# Patient Record
Sex: FEMALE | Race: WHITE | NOT HISPANIC OR LATINO | Employment: FULL TIME | URBAN - METROPOLITAN AREA
[De-identification: names, ages, dates, MRNs, and addresses within clinical notes are randomized per-mention and may not be internally consistent; named-entity substitution may affect disease eponyms.]

---

## 2018-03-21 ENCOUNTER — LAB REQUISITION (OUTPATIENT)
Dept: LAB | Facility: HOSPITAL | Age: 53
End: 2018-03-21

## 2018-03-21 DIAGNOSIS — Z00.00 ROUTINE GENERAL MEDICAL EXAMINATION AT A HEALTH CARE FACILITY: ICD-10-CM

## 2018-03-21 PROCEDURE — 36415 COLL VENOUS BLD VENIPUNCTURE: CPT

## 2018-06-20 ENCOUNTER — APPOINTMENT (OUTPATIENT)
Dept: WOMENS IMAGING | Facility: HOSPITAL | Age: 53
End: 2018-06-20

## 2018-06-20 PROCEDURE — 76830 TRANSVAGINAL US NON-OB: CPT | Performed by: RADIOLOGY

## 2018-06-20 PROCEDURE — 77067 SCR MAMMO BI INCL CAD: CPT | Performed by: RADIOLOGY

## 2018-06-20 PROCEDURE — 77063 BREAST TOMOSYNTHESIS BI: CPT | Performed by: RADIOLOGY

## 2018-09-20 ENCOUNTER — OFFICE VISIT (OUTPATIENT)
Dept: SURGERY | Facility: CLINIC | Age: 53
End: 2018-09-20

## 2018-09-20 VITALS — HEIGHT: 63 IN | HEART RATE: 109 BPM | WEIGHT: 181.6 LBS | BODY MASS INDEX: 32.18 KG/M2 | OXYGEN SATURATION: 98 %

## 2018-09-20 DIAGNOSIS — R10.33 UMBILICAL PAIN: ICD-10-CM

## 2018-09-20 DIAGNOSIS — D23.9 DERMATOFIBROMA: ICD-10-CM

## 2018-09-20 DIAGNOSIS — Z12.11 SCREEN FOR COLON CANCER: Primary | ICD-10-CM

## 2018-09-20 PROCEDURE — 99243 OFF/OP CNSLTJ NEW/EST LOW 30: CPT | Performed by: SURGERY

## 2018-09-20 RX ORDER — HYDROCODONE BITARTRATE AND ACETAMINOPHEN 7.5; 325 MG/1; MG/1
1 TABLET ORAL EVERY 8 HOURS PRN
COMMUNITY
End: 2020-10-20 | Stop reason: SDDI

## 2018-09-20 RX ORDER — CELECOXIB 200 MG/1
200 CAPSULE ORAL DAILY
COMMUNITY
End: 2021-01-15 | Stop reason: SDDI

## 2018-09-20 RX ORDER — PHENTERMINE HYDROCHLORIDE 37.5 MG/1
37.5 TABLET ORAL
COMMUNITY
End: 2020-10-20 | Stop reason: SDDI

## 2018-09-20 RX ORDER — AMITRIPTYLINE HYDROCHLORIDE 100 MG/1
100 TABLET, FILM COATED ORAL NIGHTLY
COMMUNITY
End: 2020-10-20 | Stop reason: DRUGHIGH

## 2018-09-20 NOTE — PROGRESS NOTES
SUMMARY (A/P):    53-year-old lady with 3 issues today:  1.  Periumbilical pain without visible or palpable evidence of recurrent umbilical/incisional hernia.  As her symptoms or not particular bothersome at this point no further workup is necessary.  If the pain worsens or asymmetry worsens then I recommended that she call back and we will schedule her for CT scan.  2.  Small dermatofibroma upper mid back, discussed nature of this lesion and no need for further workup or treatment.  Will return if it enlarges or becomes symptomatic.  3.  Screening colonoscopy she has not had one yet and has scheduled accordingly.      CC:    Umbilical pain, referred for consultation by Lavinia SHORT    HPI:    53-year-old lady who had previous umbilical hernia repair in 2007 and recurrent umbilical/incisional hernia repair with mesh in 2011 (by me) returns today with several month history of periumbilical pain that is mild at worst and associated with visible and palpable bulge.    PSH:    Umbilical hernia repair 2007  Incisional hernia repair with mesh 2011  Open hysterectomy (ovaries not removed) 2018    PMH:    Fibromyalgia  Anxiety/depression    FAMILY HISTORY:    Negative for colorectal cancer    SOCIAL HISTORY:   Denies tobacco use  Occasional alcohol use    ALLERGIES: reviewed, in Epic    MEDICATIONS: reviewed, in Epic    ROS:  No chest pain or shortness of air.  All other systems reviewed and negative other than presenting complaints.    PHYSICAL EXAM:   Constitutional: Well-developed well-nourished, no acute distress  Vital signs: Heart rate 109, weight 181 pounds, height feet 3 inches, BMI 32.2  Eyes: Conjunctiva normal, sclera nonicteric  ENMT: Hearing grossly normal, oral mucosa moist  Neck: Supple, no palpable mass, normal thyroid, trachea midline  Respiratory: Clear to auscultation, normal inspiratory effort  Cardiovascular: Regular rate, no murmur, no carotid bruit, no peripheral edema, no jugular venous  distention  Gastrointestinal: Soft, nontender, no palpable mass, no hepatosplenomegaly, well-healed incisions around the umbilicus with no visible or palpable evidence of hernia, there is some slight asymmetry of fat distribution on the left compared to right  Lymphatics (palpable nodes):  cervical-negative, axillary-negative  Skin:  Warm, dry, no rash on visualized skin surfaces  Musculoskeletal: Symmetric strength, normal gait  Psychiatric: Alert and oriented ×3, normal affect     BHANU SHEEHAN M.D.

## 2018-11-13 ENCOUNTER — TELEPHONE (OUTPATIENT)
Dept: SURGERY | Facility: CLINIC | Age: 53
End: 2018-11-13

## 2018-11-13 NOTE — TELEPHONE ENCOUNTER
Pt left message stating she could not make her c-scope tomorrow due to being short staffed at work. Tried to return her call to let her know Dr Ibarra is booked for the rest of the year and we would call her in Dec but her voicemail is full. I have taken her off tomorrows schedule though.

## 2020-10-02 ENCOUNTER — TELEPHONE (OUTPATIENT)
Dept: ONCOLOGY | Facility: CLINIC | Age: 55
End: 2020-10-02

## 2020-10-02 NOTE — TELEPHONE ENCOUNTER
Telephone call regarding this patient from Dr. Mathew, primary care.    She presented to him with multiple complaints.  Chemistries showed a calcium level that he states was 1/10 of a point elevated.  Serum protein electrophoresis showed a 0.3 g M spike.  He is concerned about the possibility of plasma cell dyscrasia.    I advised him that we will be glad to see her to further evaluate this issue.  This may simply be a benign monoclonal gammopathy but certainly warrants further evaluation.

## 2020-10-07 ENCOUNTER — TELEPHONE (OUTPATIENT)
Dept: ONCOLOGY | Facility: CLINIC | Age: 55
End: 2020-10-07

## 2020-10-07 NOTE — TELEPHONE ENCOUNTER
----- Message from Saúl Yun MD sent at 10/6/2020  4:14 PM EDT -----  Regarding: RE: Referral from Dr. Salgado  4 to 6 weeks is probably okay  ----- Message -----  From: Miya Moeller  Sent: 10/6/2020   3:22 PM EDT  To: Saúl Yun MD  Subject: RE: Referral from Dr. Naomi Yun - Dr Mathew office has called and referred the pt to our office that Dr Mathew had spoken to you - we are awaiting medical records before they will schedule the pt. Is there a specific timeframe that you would want to see the pt. Please advise     Thank you   Miya   ----- Message -----  From: Saúl Yun MD  Sent: 10/2/2020   3:18 PM EDT  To: [facility]  Subject: Referral from Dr. Salgado                        If this patient is referred by Dr. Mathew I will be glad to see her.  I spoke with him on the phone today. Thanks, SOPHIA

## 2020-10-07 NOTE — TELEPHONE ENCOUNTER
----- Message from Saúl Yun MD sent at 10/2/2020  3:18 PM EDT -----  Regarding: Referral from Dr. Salgado  If this patient is referred by Dr. Mathew I will be glad to see her.  I spoke with him on the phone today. Thanks, SOPHIA

## 2020-10-07 NOTE — TELEPHONE ENCOUNTER
PT HAS BEEN SCHEDULED FOR HER NEW PT APPT AND I HAVE SENT MESS TO DR SMITH - SHE WANTED TO GET IN SOONER RATHER THAN LATER

## 2020-10-20 ENCOUNTER — CONSULT (OUTPATIENT)
Dept: ONCOLOGY | Facility: CLINIC | Age: 55
End: 2020-10-20

## 2020-10-20 ENCOUNTER — LAB (OUTPATIENT)
Dept: LAB | Facility: HOSPITAL | Age: 55
End: 2020-10-20

## 2020-10-20 VITALS
TEMPERATURE: 98.2 F | HEIGHT: 61 IN | WEIGHT: 188.4 LBS | HEART RATE: 102 BPM | BODY MASS INDEX: 35.57 KG/M2 | RESPIRATION RATE: 16 BRPM | OXYGEN SATURATION: 97 % | SYSTOLIC BLOOD PRESSURE: 158 MMHG | DIASTOLIC BLOOD PRESSURE: 91 MMHG

## 2020-10-20 DIAGNOSIS — D47.2 MONOCLONAL GAMMOPATHY: Primary | ICD-10-CM

## 2020-10-20 DIAGNOSIS — R77.8 ABNORMAL SERUM PROTEIN ELECTROPHORESIS: Primary | ICD-10-CM

## 2020-10-20 LAB
ALBUMIN SERPL-MCNC: 4.8 G/DL (ref 3.5–5.2)
ALBUMIN/GLOB SERPL: 1.8 G/DL (ref 1.1–2.4)
ALP SERPL-CCNC: 66 U/L (ref 38–116)
ALT SERPL W P-5'-P-CCNC: 55 U/L (ref 0–33)
ANION GAP SERPL CALCULATED.3IONS-SCNC: 9.9 MMOL/L (ref 5–15)
AST SERPL-CCNC: 34 U/L (ref 0–32)
B2 MICROGLOB SERPL-MCNC: 1.7 MG/L (ref 0.8–2.2)
BASOPHILS # BLD AUTO: 0.1 10*3/MM3 (ref 0–0.2)
BASOPHILS NFR BLD AUTO: 0.8 % (ref 0–1.5)
BILIRUB SERPL-MCNC: 0.3 MG/DL (ref 0.2–1.2)
BUN SERPL-MCNC: 11 MG/DL (ref 6–20)
BUN/CREAT SERPL: 12 (ref 7.3–30)
CALCIUM SPEC-SCNC: 9.9 MG/DL (ref 8.5–10.2)
CHLORIDE SERPL-SCNC: 101 MMOL/L (ref 98–107)
CO2 SERPL-SCNC: 27.1 MMOL/L (ref 22–29)
CREAT SERPL-MCNC: 0.92 MG/DL (ref 0.6–1.1)
DEPRECATED RDW RBC AUTO: 41.7 FL (ref 37–54)
EOSINOPHIL # BLD AUTO: 0.14 10*3/MM3 (ref 0–0.4)
EOSINOPHIL NFR BLD AUTO: 1.1 % (ref 0.3–6.2)
ERYTHROCYTE [DISTWIDTH] IN BLOOD BY AUTOMATED COUNT: 12.2 % (ref 12.3–15.4)
GFR SERPL CREATININE-BSD FRML MDRD: 64 ML/MIN/1.73
GLOBULIN UR ELPH-MCNC: 2.7 GM/DL (ref 1.8–3.5)
GLUCOSE SERPL-MCNC: 86 MG/DL (ref 74–124)
HCT VFR BLD AUTO: 42 % (ref 34–46.6)
HGB BLD-MCNC: 14.5 G/DL (ref 12–15.9)
IMM GRANULOCYTES # BLD AUTO: 0.06 10*3/MM3 (ref 0–0.05)
IMM GRANULOCYTES NFR BLD AUTO: 0.5 % (ref 0–0.5)
LYMPHOCYTES # BLD AUTO: 4.4 10*3/MM3 (ref 0.7–3.1)
LYMPHOCYTES NFR BLD AUTO: 34.7 % (ref 19.6–45.3)
MCH RBC QN AUTO: 31.9 PG (ref 26.6–33)
MCHC RBC AUTO-ENTMCNC: 34.5 G/DL (ref 31.5–35.7)
MCV RBC AUTO: 92.5 FL (ref 79–97)
MONOCYTES # BLD AUTO: 0.71 10*3/MM3 (ref 0.1–0.9)
MONOCYTES NFR BLD AUTO: 5.6 % (ref 5–12)
NEUTROPHILS NFR BLD AUTO: 57.3 % (ref 42.7–76)
NEUTROPHILS NFR BLD AUTO: 7.28 10*3/MM3 (ref 1.7–7)
NRBC BLD AUTO-RTO: 0 /100 WBC (ref 0–0.2)
PLATELET # BLD AUTO: 383 10*3/MM3 (ref 140–450)
PMV BLD AUTO: 9.4 FL (ref 6–12)
POTASSIUM SERPL-SCNC: 4.1 MMOL/L (ref 3.5–4.7)
PROT SERPL-MCNC: 7.5 G/DL (ref 6.3–8)
RBC # BLD AUTO: 4.54 10*6/MM3 (ref 3.77–5.28)
SODIUM SERPL-SCNC: 138 MMOL/L (ref 134–145)
WBC # BLD AUTO: 12.69 10*3/MM3 (ref 3.4–10.8)

## 2020-10-20 PROCEDURE — 36415 COLL VENOUS BLD VENIPUNCTURE: CPT

## 2020-10-20 PROCEDURE — 82232 ASSAY OF BETA-2 PROTEIN: CPT | Performed by: INTERNAL MEDICINE

## 2020-10-20 PROCEDURE — 80053 COMPREHEN METABOLIC PANEL: CPT | Performed by: INTERNAL MEDICINE

## 2020-10-20 PROCEDURE — 99205 OFFICE O/P NEW HI 60 MIN: CPT | Performed by: INTERNAL MEDICINE

## 2020-10-20 PROCEDURE — 85025 COMPLETE CBC W/AUTO DIFF WBC: CPT

## 2020-10-20 RX ORDER — OXYCODONE HYDROCHLORIDE 5 MG/1
TABLET ORAL
COMMUNITY
Start: 2020-09-29 | End: 2021-01-15 | Stop reason: SDDI

## 2020-10-20 RX ORDER — NITROGLYCERIN 0.4 MG/1
TABLET SUBLINGUAL
COMMUNITY
Start: 2020-10-01

## 2020-10-20 RX ORDER — TRAZODONE HYDROCHLORIDE 50 MG/1
TABLET ORAL
COMMUNITY
Start: 2020-10-19

## 2020-10-20 RX ORDER — METOPROLOL SUCCINATE 25 MG/1
TABLET, EXTENDED RELEASE ORAL
COMMUNITY
Start: 2020-10-01 | End: 2021-06-25

## 2020-10-20 RX ORDER — AMITRIPTYLINE HYDROCHLORIDE 50 MG/1
TABLET, FILM COATED ORAL
COMMUNITY
Start: 2020-09-14

## 2020-10-20 NOTE — PROGRESS NOTES
REFERRING PROVIDER:    Fredrick Mathew MD  [address] [place] [id number]    REASON FOR CONSULTATION:    IgG lambda monoclonal gammopathy    HISTORY OF PRESENT ILLNESS:  Nicki Pepe is a 55 y.o. female who is referred today for further evaluation of IgG lambda monoclonal gammopathy.    She presented to primary care for further evaluation of chest pain that had been going on for a couple of months.  This is associated with shortness of breath.  It is associated with anxiety and stressful situations.  It did improve with nitroglycerin.  She had a stress test but has not heard the result.    Labs 9/18/2020 with creatinine 0.82, total protein 7.9, albumin 5.2 (high), ALT 52, iron 164 (high), calcium 10.3 (slightly high), and hgb 13.7.    Due to hypercalcemia she had a SPEP and MARCELO on 9/25/2020 showed  0.3 gm IgG lambda M spike.  Ferritin 56.    She denies any prior history of blood disorders.  She does have some chronic pain issues and has been diagnosed with fibromyalgia.  She does have some fatigue.    Past Medical History:   Diagnosis Date   • Anxiety    • Depression    • Fibromyalgia    • Osteoarthritis        Past Surgical History:   Procedure Laterality Date   • HYSTERECTOMY N/A 07/23/2018   • INCISIONAL HERNIA REPAIR N/A 12/23/2011    Open incisional hernia repair with mesh-Dr. Lawrence Ibarra   • REDUCTION MAMMAPLASTY Bilateral 12/26/2006    Dr. SARAH Nobles   • TUBAL ABDOMINAL LIGATION  1996       SOCIAL HISTORY:   reports that she has never smoked. She has never used smokeless tobacco. She reports current alcohol use of about 1.0 standard drinks of alcohol per week. She reports that she does not use drugs.    FAMILY HISTORY:  family history includes Pancreatic cancer in her father.    ALLERGIES:  No Known Allergies    MEDICATIONS:  The medication list has been reviewed with the patient by the medical assistant, and the list has been updated in the electronic medical record, which I reviewed.   "Medication dosages and frequencies were confirmed to be accurate.    Review of Systems   Constitutional: Positive for fatigue.   Respiratory: Positive for shortness of breath.    Cardiovascular: Positive for chest pain.   Musculoskeletal: Positive for arthralgias, back pain and myalgias.   Psychiatric/Behavioral: The patient is nervous/anxious.    All other systems reviewed and are negative.      Vitals:    10/20/20 1423   BP: 158/91   Pulse: 102   Resp: 16   Temp: 98.2 °F (36.8 °C)   TempSrc: Temporal   SpO2: 97%   Weight: 85.5 kg (188 lb 6.4 oz)   Height: 155 cm (61.02\")   PainSc: 0-No pain  Comment: Abnormal serum protien electrophresis       Physical Exam  Vitals signs reviewed.   Constitutional:       Appearance: She is well-developed.   HENT:      Head: Normocephalic and atraumatic.      Comments: Wearing a facemask  Eyes:      Conjunctiva/sclera: Conjunctivae normal.      Pupils: Pupils are equal, round, and reactive to light.   Neck:      Musculoskeletal: Neck supple.   Cardiovascular:      Rate and Rhythm: Normal rate and regular rhythm.      Heart sounds: Normal heart sounds, S1 normal and S2 normal. No murmur. No friction rub. No gallop.    Pulmonary:      Effort: Pulmonary effort is normal. No respiratory distress.      Breath sounds: Normal breath sounds. No stridor. No wheezing, rhonchi or rales.   Chest:      Chest wall: No tenderness.   Abdominal:      General: Bowel sounds are normal. There is no distension.      Palpations: Abdomen is soft. There is no mass.      Tenderness: There is no abdominal tenderness. There is no guarding or rebound.   Musculoskeletal: Normal range of motion.   Lymphadenopathy:      Cervical: No cervical adenopathy.      Upper Body:      Right upper body: No supraclavicular adenopathy.      Left upper body: No supraclavicular adenopathy.   Skin:     General: Skin is warm and dry.      Findings: No erythema or rash.   Neurological:      Mental Status: She is alert and " oriented to person, place, and time.      Cranial Nerves: No cranial nerve deficit.      Sensory: No sensory deficit.   Psychiatric:         Behavior: Behavior normal.         Thought Content: Thought content normal.         Judgment: Judgment normal.         DIAGNOSTIC DATA:  Results for orders placed or performed in visit on 10/20/20   CBC Auto Differential    Specimen: Blood   Result Value Ref Range    WBC 12.69 (H) 3.40 - 10.80 10*3/mm3    RBC 4.54 3.77 - 5.28 10*6/mm3    Hemoglobin 14.5 12.0 - 15.9 g/dL    Hematocrit 42.0 34.0 - 46.6 %    MCV 92.5 79.0 - 97.0 fL    MCH 31.9 26.6 - 33.0 pg    MCHC 34.5 31.5 - 35.7 g/dL    RDW 12.2 (L) 12.3 - 15.4 %    RDW-SD 41.7 37.0 - 54.0 fl    MPV 9.4 6.0 - 12.0 fL    Platelets 383 140 - 450 10*3/mm3    Neutrophil % 57.3 42.7 - 76.0 %    Lymphocyte % 34.7 19.6 - 45.3 %    Monocyte % 5.6 5.0 - 12.0 %    Eosinophil % 1.1 0.3 - 6.2 %    Basophil % 0.8 0.0 - 1.5 %    Immature Grans % 0.5 0.0 - 0.5 %    Neutrophils, Absolute 7.28 (H) 1.70 - 7.00 10*3/mm3    Lymphocytes, Absolute 4.40 (H) 0.70 - 3.10 10*3/mm3    Monocytes, Absolute 0.71 0.10 - 0.90 10*3/mm3    Eosinophils, Absolute 0.14 0.00 - 0.40 10*3/mm3    Basophils, Absolute 0.10 0.00 - 0.20 10*3/mm3    Immature Grans, Absolute 0.06 (H) 0.00 - 0.05 10*3/mm3    nRBC 0.0 0.0 - 0.2 /100 WBC       IMAGING:    I personally reviewed the peripheral blood smear.  Red cells appear normal.  Platelets adequate in number and normal in morphology.  I do not visualize any significant white blood cell abnormalities.    ASSESSMENT:  This is a 55 y.o. female with:    *IgG lambda monoclonal gammopathy  • SPEP/MARCELO on 9/25/2020 with a 0.3 gm IgG lambda M spike  • No anemia or kidney disease is evident  • Calcium was slightly high at 10.3  • Likely MGUS    *Hypercalcemia  • Ca 10.3 on 9/18/2020  • Repeat today    *Chest pain  • She recently had a stress test.  Result unknown at this point.  • If this persists and further cardiac evaluation is  negative some imaging of her chest with a chest CT would be reasonable.  I think a plasmacytoma is unlikely but this could be possible.    PLAN:   1. Repeat SPEP/MARCELO today and check serum free light chains and beta-2 microglobulin. CMP today as well.  2. At this point I do not believe that a bone marrow aspiration and biopsy or skeletal survey are necessary.  3. If her cardiac evaluation is unremarkable and the chest pain persists, consider CT imaging of her chest.  4. I will notify her of any abnormal laboratory results and otherwise plan to see her back in a few months for follow-up.

## 2020-10-21 LAB
ALBUMIN SERPL ELPH-MCNC: 4 G/DL (ref 2.9–4.4)
ALBUMIN/GLOB SERPL: 1.2 {RATIO} (ref 0.7–1.7)
ALPHA1 GLOB SERPL ELPH-MCNC: 0.2 G/DL (ref 0–0.4)
ALPHA2 GLOB SERPL ELPH-MCNC: 1 G/DL (ref 0.4–1)
B-GLOBULIN SERPL ELPH-MCNC: 1.1 G/DL (ref 0.7–1.3)
GAMMA GLOB SERPL ELPH-MCNC: 1.2 G/DL (ref 0.4–1.8)
GLOBULIN SER-MCNC: 3.5 G/DL (ref 2.2–3.9)
IGA SERPL-MCNC: 224 MG/DL (ref 87–352)
IGG SERPL-MCNC: 1037 MG/DL (ref 586–1602)
IGM SERPL-MCNC: 115 MG/DL (ref 26–217)
INTERPRETATION SERPL IEP-IMP: ABNORMAL
KAPPA LC FREE SER-MCNC: 20 MG/L (ref 3.3–19.4)
KAPPA LC FREE/LAMBDA FREE SER: 1.22 {RATIO} (ref 0.26–1.65)
LABORATORY COMMENT REPORT: ABNORMAL
LAMBDA LC FREE SERPL-MCNC: 16.4 MG/L (ref 5.7–26.3)
M PROTEIN SERPL ELPH-MCNC: 0.4 G/DL
PROT SERPL-MCNC: 7.5 G/DL (ref 6–8.5)

## 2020-10-26 ENCOUNTER — TELEPHONE (OUTPATIENT)
Dept: ONCOLOGY | Facility: HOSPITAL | Age: 55
End: 2020-10-26

## 2020-10-26 NOTE — TELEPHONE ENCOUNTER
REC'D MESSAGE PER DR. SMITH TO LET PT KNOW ABOUT ABN PROTEIN RESULTS - NO WORRIES, F/U AS SCHEDULED. MADE PT'S  AWARE AND HE V/U.

## 2021-01-15 ENCOUNTER — LAB (OUTPATIENT)
Dept: OTHER | Facility: HOSPITAL | Age: 56
End: 2021-01-15

## 2021-01-15 ENCOUNTER — OFFICE VISIT (OUTPATIENT)
Dept: ONCOLOGY | Facility: CLINIC | Age: 56
End: 2021-01-15

## 2021-01-15 VITALS
BODY MASS INDEX: 34.76 KG/M2 | RESPIRATION RATE: 16 BRPM | DIASTOLIC BLOOD PRESSURE: 84 MMHG | HEART RATE: 101 BPM | WEIGHT: 196.2 LBS | TEMPERATURE: 98 F | SYSTOLIC BLOOD PRESSURE: 149 MMHG | OXYGEN SATURATION: 96 % | HEIGHT: 63 IN

## 2021-01-15 DIAGNOSIS — D47.2 MONOCLONAL GAMMOPATHY: ICD-10-CM

## 2021-01-15 DIAGNOSIS — D47.2 MONOCLONAL GAMMOPATHY: Primary | ICD-10-CM

## 2021-01-15 LAB
ALBUMIN SERPL-MCNC: 4.8 G/DL (ref 3.5–5.2)
ALBUMIN/GLOB SERPL: 1.5 G/DL
ALP SERPL-CCNC: 100 U/L (ref 39–117)
ALT SERPL W P-5'-P-CCNC: 70 U/L (ref 1–33)
ANION GAP SERPL CALCULATED.3IONS-SCNC: 9.2 MMOL/L (ref 5–15)
AST SERPL-CCNC: 31 U/L (ref 1–32)
BASOPHILS # BLD AUTO: 0.06 10*3/MM3 (ref 0–0.2)
BASOPHILS NFR BLD AUTO: 0.5 % (ref 0–1.5)
BILIRUB SERPL-MCNC: 0.4 MG/DL (ref 0–1.2)
BUN SERPL-MCNC: 13 MG/DL (ref 6–20)
BUN/CREAT SERPL: 15.5 (ref 7–25)
CALCIUM SPEC-SCNC: 10.1 MG/DL (ref 8.6–10.5)
CHLORIDE SERPL-SCNC: 98 MMOL/L (ref 98–107)
CO2 SERPL-SCNC: 30.8 MMOL/L (ref 22–29)
CREAT SERPL-MCNC: 0.84 MG/DL (ref 0.57–1)
DEPRECATED RDW RBC AUTO: 46.1 FL (ref 37–54)
EOSINOPHIL # BLD AUTO: 0.24 10*3/MM3 (ref 0–0.4)
EOSINOPHIL NFR BLD AUTO: 2 % (ref 0.3–6.2)
ERYTHROCYTE [DISTWIDTH] IN BLOOD BY AUTOMATED COUNT: 13 % (ref 12.3–15.4)
GFR SERPL CREATININE-BSD FRML MDRD: 71 ML/MIN/1.73
GLOBULIN UR ELPH-MCNC: 3.2 GM/DL
GLUCOSE SERPL-MCNC: 129 MG/DL (ref 65–99)
HCT VFR BLD AUTO: 42.2 % (ref 34–46.6)
HGB BLD-MCNC: 13.9 G/DL (ref 12–15.9)
IMM GRANULOCYTES # BLD AUTO: 0.04 10*3/MM3 (ref 0–0.05)
IMM GRANULOCYTES NFR BLD AUTO: 0.3 % (ref 0–0.5)
LYMPHOCYTES # BLD AUTO: 4.25 10*3/MM3 (ref 0.7–3.1)
LYMPHOCYTES NFR BLD AUTO: 35.7 % (ref 19.6–45.3)
MCH RBC QN AUTO: 31.6 PG (ref 26.6–33)
MCHC RBC AUTO-ENTMCNC: 32.9 G/DL (ref 31.5–35.7)
MCV RBC AUTO: 95.9 FL (ref 79–97)
MONOCYTES # BLD AUTO: 0.57 10*3/MM3 (ref 0.1–0.9)
MONOCYTES NFR BLD AUTO: 4.8 % (ref 5–12)
NEUTROPHILS NFR BLD AUTO: 56.7 % (ref 42.7–76)
NEUTROPHILS NFR BLD AUTO: 6.75 10*3/MM3 (ref 1.7–7)
NRBC BLD AUTO-RTO: 0 /100 WBC (ref 0–0.2)
PLATELET # BLD AUTO: 358 10*3/MM3 (ref 140–450)
PMV BLD AUTO: 9 FL (ref 6–12)
POTASSIUM SERPL-SCNC: 3.7 MMOL/L (ref 3.5–5.2)
PROT SERPL-MCNC: 8 G/DL (ref 6–8.5)
RBC # BLD AUTO: 4.4 10*6/MM3 (ref 3.77–5.28)
SODIUM SERPL-SCNC: 138 MMOL/L (ref 136–145)
WBC # BLD AUTO: 11.91 10*3/MM3 (ref 3.4–10.8)

## 2021-01-15 PROCEDURE — 82784 ASSAY IGA/IGD/IGG/IGM EACH: CPT | Performed by: INTERNAL MEDICINE

## 2021-01-15 PROCEDURE — 80053 COMPREHEN METABOLIC PANEL: CPT | Performed by: INTERNAL MEDICINE

## 2021-01-15 PROCEDURE — 83883 ASSAY NEPHELOMETRY NOT SPEC: CPT | Performed by: INTERNAL MEDICINE

## 2021-01-15 PROCEDURE — 99214 OFFICE O/P EST MOD 30 MIN: CPT | Performed by: INTERNAL MEDICINE

## 2021-01-15 PROCEDURE — 84165 PROTEIN E-PHORESIS SERUM: CPT | Performed by: INTERNAL MEDICINE

## 2021-01-15 PROCEDURE — 85025 COMPLETE CBC W/AUTO DIFF WBC: CPT | Performed by: INTERNAL MEDICINE

## 2021-01-15 PROCEDURE — 86334 IMMUNOFIX E-PHORESIS SERUM: CPT | Performed by: INTERNAL MEDICINE

## 2021-01-15 PROCEDURE — 36415 COLL VENOUS BLD VENIPUNCTURE: CPT

## 2021-01-15 RX ORDER — HYDROCODONE BITARTRATE AND ACETAMINOPHEN 7.5; 325 MG/1; MG/1
1 TABLET ORAL EVERY 6 HOURS PRN
COMMUNITY

## 2021-01-15 NOTE — PROGRESS NOTES
"[facility] CBC GROUP OUTPATIENT FOLLOW UP CLINIC VISIT    REASON FOR FOLLOW-UP:    IgG lambda monoclonal gammopathy    HISTORY OF PRESENT ILLNESS:  Nicki Pepe is a 55 y.o. female who returns today for follow up of the above issue.      She overall is doing about the same.  She has chronic pain.  Chest pain specifically has resolved.  She did have a lot of left arm pain after her second Covid vaccine injection but this has improved.    REVIEW OF SYSTEMS:  Ongoing chronic fatigue.  Chronic pain.    PHYSICAL EXAMINATION:    Vitals:    01/15/21 1505   BP: 149/84   Pulse: 101   Resp: 16   Temp: 98 °F (36.7 °C)   TempSrc: Temporal   SpO2: 96%   Weight: 89 kg (196 lb 3.2 oz)   Height: 160 cm (62.99\")   PainSc: 0-No pain  Comment: IgG lambda monoclonal gammopathy       GENERAL:  Well-developed well-nourished female; awake, alert and oriented, in no acute distress.  SKIN:  Warm and dry, without rashes, purpura, or petechiae.  HEAD:  Normocephalic, atraumatic. Wearing a facemask.  EARS:  Hearing intact.  LYMPHATICS:  No cervical, supraclavicular, or axillary lymphadenopathy.  CHEST:  Lungs are clear to auscultation bilaterally.  No wheezes, rales, or rhonchi.  HEART:  Regular rate; normal rhythm.  No murmurs, gallops or rubs.  EXTREMITIES:  No clubbing cyanosis or edema.  NEUROLOGICAL:  No focal neurologic deficits.    DIAGNOSTIC DATA:  Comprehensive Metabolic Panel (01/15/2021 15:01)  CBC & Differential (01/15/2021 15:01)      IMAGING:  None reviewed    ASSESSMENT:  This is a 55 y.o. female with:    *IgG lambda monoclonal gammopathy  · SPEP/MARCELO on 9/25/2020 with a 0.3 gm IgG lambda M spike  · No anemia or kidney disease is evident  · Calcium was slightly high at 10.3  · Labs 10/20/2020 with a 0.4 gm M spike, K/L ratio normal  · Labs pending from today     *History of hypercalcemia  · Ca 10.3 on 9/18/2020  · Repeat was 9.9 on 10/20/2020  · Calcium normal today     *Chest pain  · She states that her stress test was " Continue Lisinopril, HCTZ  4/29: Hold lasix for now. Already had dose this am. Give gentle hydration given rising Cr. Likely a little dry  4/30 cont to hold lasix, lisinopril and hctz LAUREL hose.   unremarkable.  Chest pain has essentially resolved.     *Elevated liver labs  · AST is now normal at 31 with an ALT a little bit more elevated at 70  · Continue monitoring    PLAN:   1. Follow up SPEP/MARCELO/SFLC  2. At this point I do not believe that a bone marrow aspiration and biopsy or skeletal survey are necessary.  3. Follow-up in 6 months with a CBC, CMP, serum protein studies

## 2021-01-18 LAB
ALBUMIN SERPL ELPH-MCNC: 3.9 G/DL (ref 2.9–4.4)
ALBUMIN/GLOB SERPL: 1.1 {RATIO} (ref 0.7–1.7)
ALPHA1 GLOB SERPL ELPH-MCNC: 0.3 G/DL (ref 0–0.4)
ALPHA2 GLOB SERPL ELPH-MCNC: 0.8 G/DL (ref 0.4–1)
B-GLOBULIN SERPL ELPH-MCNC: 1.5 G/DL (ref 0.7–1.3)
GAMMA GLOB SERPL ELPH-MCNC: 1 G/DL (ref 0.4–1.8)
GLOBULIN SER-MCNC: 3.7 G/DL (ref 2.2–3.9)
IGA SERPL-MCNC: 233 MG/DL (ref 87–352)
IGG SERPL-MCNC: 976 MG/DL (ref 586–1602)
IGM SERPL-MCNC: 129 MG/DL (ref 26–217)
INTERPRETATION SERPL IEP-IMP: ABNORMAL
KAPPA LC FREE SER-MCNC: 21.4 MG/L (ref 3.3–19.4)
KAPPA LC FREE/LAMBDA FREE SER: 1.03 {RATIO} (ref 0.26–1.65)
LABORATORY COMMENT REPORT: ABNORMAL
LAMBDA LC FREE SERPL-MCNC: 20.7 MG/L (ref 5.7–26.3)
M PROTEIN SERPL ELPH-MCNC: 0.4 G/DL
PROT SERPL-MCNC: 7.6 G/DL (ref 6–8.5)

## 2021-05-03 ENCOUNTER — TELEPHONE (OUTPATIENT)
Dept: ORTHOPEDIC SURGERY | Facility: CLINIC | Age: 56
End: 2021-05-03

## 2021-06-09 NOTE — PROGRESS NOTES
Subjective   History of Present Illness: Nicki Pepe is a 56 y.o. female is being seen for consultation today at the request of KORIN Metzger for constant neck pain that radiates into bilateral arms; right greater than left with numbness, tingling and weakness depending on activity level.  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  She denies any specific injury that led to this pattern of symptomatology.  She also reports constant back pain that radiates into bilateral buttocks; right greater than left. She reports numbness in bilateral legs. She denies any loss of bowel and bladder loss. She is currently in pain management with Dr. Knight and has tried RFA's and trigger point injections which have given her temporary satisfaction.     She presents with cervical thoracic and lumbar MRIs but really her primary complaint is the upper thoracic and right shoulder blade pain that keeps her from comfortably working and doing everyday activities.  The low back pain is a nuisance but not limiting.    While in the room and during my examination of the patient I wore a mask and eye protection.  I washed my hands before and after this patient encounter.  The patient was also wearing a mask.    Neck Pain   The problem occurs constantly. The problem has been gradually worsening. The pain is present in the left side, midline and right side. The quality of the pain is described as aching, burning, cramping, stabbing and shooting. The symptoms are aggravated by position, twisting and bending. Associated symptoms include numbness, tingling and weakness. Treatments tried: injection therapy.   Back Pain  The problem occurs constantly. The problem has been gradually worsening since onset. The pain is present in the lumbar spine and thoracic spine. The quality of the pain is  "described as aching and shooting. The pain radiates to the right thigh, right knee, right foot, left thigh, left knee and left foot. The symptoms are aggravated by position. Associated symptoms include numbness, tingling and weakness. Pertinent negatives include no bladder incontinence or bowel incontinence.       The following portions of the patient's history were reviewed and updated as appropriate: allergies, current medications, past family history, past medical history, past social history, past surgical history and problem list.    Review of Systems   Constitutional: Positive for activity change.   HENT: Negative for congestion.    Eyes: Negative for visual disturbance.   Respiratory: Negative for chest tightness and shortness of breath.    Gastrointestinal: Negative for bowel incontinence and nausea.   Endocrine: Negative for cold intolerance and heat intolerance.   Genitourinary: Negative for bladder incontinence and difficulty urinating.   Musculoskeletal: Positive for back pain and neck pain.   Skin: Negative for rash.   Allergic/Immunologic: Negative for environmental allergies.   Neurological: Positive for tingling, weakness and numbness.   Hematological: Does not bruise/bleed easily.   Psychiatric/Behavioral: Positive for sleep disturbance.       Objective     Vitals:    06/25/21 0836   BP: 132/86   Temp: 98.3 °F (36.8 °C)   Weight: 88.9 kg (196 lb)   Height: 160 cm (62.99\")     Body mass index is 34.73 kg/m².      Physical Exam  Neurologic Exam    Physical Exam:    CONSTITUTIONAL: This 56 year old right handed  female appears well developed, well-nourished and in no acute distress.    HEAD & FACE: the head and face are symmetric, normocephalic and atraumatic.    EYES: Inspection of the conjunctivae and lids reveals no swelling, erythema or discharge.  Pupils are round, equal and reactive to light and there is no scleral icterus.    EARS, NOSE, MOUTH & THROAT: On inspection, the ears and nose " are within normal limits.    NECK: the neck is supple and symmetric. The trachea is midline with no masses.    PULMONARY: Respiratory effort is normal with no increased work of breathing or signs of respiratory distress.    CARDIOVASCULAR: Pedal pulses are +2/4 bilaterally. Examination of the extremities shows no edema or varicosities.    LYMPHATIC: There is no palpable lymphadenopathy of the neck.    MUSCULOSKELETAL: Gait and station are within normal limits. The spine has normal alignment and range of motion.  She recently had left knee surgery and her left knee is about 50% larger than her right knee and is tender with mobility.  She does not have any restricted motion of her shoulders most notably the right.    SKIN: The skin is warm, dry and intact    NEUROLOGIC:   Cranial Nerves 2-12 intact  Normal motor strength noted. Muscle bulk and tone are normal.  Sensory exam is normal to fine touch to confrontational testing bilaterally  Reflexes on the right side demonstrates 0/4 Triceps Reflex, 1/4 Biceps Reflex, 0/4 Brachioradialis Reflex, 0/4 Knee Jerk Reflex, 0/4 Ankle Jerk Reflex and no ankle clonus on the right.   Reflexes on the left side demonstrates 0/4 Triceps Reflex, 1/4 Biceps Reflex, 0/4 Brachioradialis Reflex, 0/4 Knee Jerk Reflex, 0/4 Ankle Jerk Reflex and no ankle clonus on the left.  Superficial/Primitive Reflexes: primitive reflexes were absent.  Valerio's, Babinski, and Clonus signs all negative.  No coordination deficit observed.  Radicular testing showed a negative Marcial (HAO) test and negative straight leg raise.  Spurling's maneuver is negative.  Cortical function is intact and without deficits. Speech is normal.    PSYCHIATRIC: oriented to person, place and time. Patient's mood and affect are normal.    Assessment/Plan   Independent Review of Radiographic Studies:      I personally reviewed the images from the following studies.    MRI of the lumbar spine done on February 12, 2021 at  [facility] imaging reveals grade 1 spondylolisthesis at L4-5 which shows progression from an examination reviewed in 2017.  There is advanced facet arthropathy with facet joint effusions that is also progressed.  Despite these changes there is only minimal central stenosis and mild foraminal narrowing.    MRI of the cervical spine done on February 12, 2021 at [facility] imaging reveals mild degenerative change throughout the cervical spine.  There really is no dramatic threat to the cervical spinal cord with slight contact of the anterior thecal sac at C5-C6 but no cord compression.  Due to degenerative change there is at the most moderate left neuroforaminal narrowing at C5-C6 and C6-C7 but no severe neuroforaminal narrowing nor significant neuroforaminal impingement.  Radiologist mentions offset of C3C4 and C4C5 which is not readily apparent or considered significant by this examiner.    MRI of the thoracic spine done in February 12, 2021 suggest typical degenerative endplate changes in the thoracic spine with disc bulging but no high-grade spinal canal narrowing nor cord compression.  No obvious neuroforaminal encroachment in the thoracic spine.    Medical Decision Making:      It is difficult to tell the source of the aspect of her pain she does not have any threat to the neurologic elements to the right side of the cervical or thoracic spine and her musculoskeletal and neurologic exams are normal.  Therefore, I see no role for any neurosurgical intervention.  She has never really had formal physical therapy to address the pattern of pain and I think that it is difficult to determine whether this is a shoulder girdle problem rather than a lower cervical/upper thoracic problem.  This is something a physical medicine rehab specialist may be well equipped to make recommendations since she feels like she has exhausted the interventional pain management options.     I told her radiographically there is more concern  regarding the spondylolisthesis of the lumbar region but fortunately she does not have any intractable radicular symptoms to suggest that she would need a deformity correction.  Hopefully her lumbar spine condition will stabilize over time.    No follow-ups on file.    Diagnoses and all orders for this visit:    1. Pain in thoracic spine (Primary)  -     Ambulatory Referral to Physical Medicine Rehab    2. Chronic right shoulder pain  -     Ambulatory Referral to Physical Medicine Rehab    3. Spondylolisthesis, lumbar region             Justen Lamb MD FACS FAANS  Neurological Surgery

## 2021-06-25 ENCOUNTER — OFFICE VISIT (OUTPATIENT)
Dept: NEUROSURGERY | Facility: CLINIC | Age: 56
End: 2021-06-25

## 2021-06-25 VITALS
BODY MASS INDEX: 34.73 KG/M2 | TEMPERATURE: 98.3 F | DIASTOLIC BLOOD PRESSURE: 86 MMHG | WEIGHT: 196 LBS | SYSTOLIC BLOOD PRESSURE: 132 MMHG | HEIGHT: 63 IN

## 2021-06-25 DIAGNOSIS — M54.6 PAIN IN THORACIC SPINE: Primary | ICD-10-CM

## 2021-06-25 DIAGNOSIS — G89.29 CHRONIC RIGHT SHOULDER PAIN: ICD-10-CM

## 2021-06-25 DIAGNOSIS — M43.16 SPONDYLOLISTHESIS, LUMBAR REGION: ICD-10-CM

## 2021-06-25 DIAGNOSIS — M25.511 CHRONIC RIGHT SHOULDER PAIN: ICD-10-CM

## 2021-06-25 PROCEDURE — 99204 OFFICE O/P NEW MOD 45 MIN: CPT | Performed by: NEUROLOGICAL SURGERY

## 2021-06-25 RX ORDER — PROMETHAZINE HYDROCHLORIDE 25 MG/1
TABLET ORAL
COMMUNITY
Start: 2021-06-15

## 2021-06-25 RX ORDER — GABAPENTIN 300 MG/1
CAPSULE ORAL
COMMUNITY
Start: 2021-06-01